# Patient Record
(demographics unavailable — no encounter records)

---

## 2020-10-26 NOTE — ULT
Exam: Transabdominal pelvic ultrasound



HISTORY: Spotting this morning.



TECHNIQUE: Transabdominal imaging of the pelvis is performed. Ovaries are interrogated with grayscale
, color flow, Doppler imaging and spectral waveform analysis



FINDINGS:

Uterus: No myometrial masses. Uterus measures 9.9 x 4.3 x 6.0 cm

Endometrium: There is a gestational sac, yolk sac, and fetal pole. Crown-rump length is 0.73 cm corre
sponding to gestational age of 6 weeks 4 days

Fetal heart tones: 150 bpm

Subchronic hemorrhage: None

Free fluid: None

Left ovary: Not appreciated. No obvious masses or fluid in left adnexa

Right ovary: Normal echotexture measuring 1.5 x 2.9 x 2.3 cm

Ovarian Doppler: Vascular flow to the right ovary. Left ovary is not appreciated.



IMPRESSION:

Single intrauterine gestation with fetal heart tones. Gestational age by crown-rump length is 6 weeks
 4 day.



Reported By: Salo Caldwell 

Electronically Signed:  10/26/2020 8:12 AM